# Patient Record
Sex: MALE | Race: WHITE
[De-identification: names, ages, dates, MRNs, and addresses within clinical notes are randomized per-mention and may not be internally consistent; named-entity substitution may affect disease eponyms.]

---

## 2019-07-29 ENCOUNTER — HOSPITAL ENCOUNTER (EMERGENCY)
Dept: HOSPITAL 33 - ED | Age: 59
Discharge: HOME | End: 2019-07-29
Payer: MEDICARE

## 2019-07-29 VITALS — DIASTOLIC BLOOD PRESSURE: 73 MMHG | SYSTOLIC BLOOD PRESSURE: 145 MMHG

## 2019-07-29 VITALS — OXYGEN SATURATION: 98 %

## 2019-07-29 VITALS — HEART RATE: 137 BPM

## 2019-07-29 DIAGNOSIS — R41.0: ICD-10-CM

## 2019-07-29 DIAGNOSIS — Z79.4: ICD-10-CM

## 2019-07-29 DIAGNOSIS — Z79.01: ICD-10-CM

## 2019-07-29 DIAGNOSIS — E16.2: Primary | ICD-10-CM

## 2019-07-29 LAB
ALBUMIN SERPL-MCNC: 4.2 G/DL (ref 3.5–5)
ALP SERPL-CCNC: 131 U/L (ref 38–126)
ALT SERPL-CCNC: 16 U/L (ref 0–50)
ANION GAP SERPL CALC-SCNC: 11.9 MEQ/L (ref 5–15)
ANISOCYTOSIS BLD QL: (no result)
AST SERPL QL: 21 U/L (ref 17–59)
BILIRUB BLD-MCNC: 0.5 MG/DL (ref 0.2–1.3)
BUN SERPL-MCNC: 23 MG/DL (ref 9–20)
CALCIUM SPEC-MCNC: 8.9 MG/DL (ref 8.4–10.2)
CELLS COUNTED: 100
CHLORIDE SERPL-SCNC: 103 MMOL/L (ref 98–107)
CO2 SERPL-SCNC: 27 MMOL/L (ref 22–30)
CREAT SERPL-MCNC: 1.17 MG/DL (ref 0.66–1.25)
ETHANOL SERPL-MCNC: < 10 MG/DL (ref 0–10)
GLUCOSE SERPL-MCNC: 197 MG/DL (ref 74–106)
HCT VFR BLD AUTO: 45.7 % (ref 42–50)
HGB BLD-MCNC: 14.9 GM/DL (ref 12.5–18)
MCH RBC QN AUTO: 28.6 PG (ref 26–32)
MCHC RBC AUTO-ENTMCNC: 32.6 G/DL (ref 32–36)
PLATELET # BLD AUTO: 194 K/MM3 (ref 150–450)
POIKILOCYTOSIS BLD QL SMEAR: (no result)
POTASSIUM SERPLBLD-SCNC: 4.2 MMOL/L (ref 3.5–5.1)
PROT SERPL-MCNC: 8.4 G/DL (ref 6.3–8.2)
RBC # BLD AUTO: 5.21 M/MM3 (ref 4.1–5.6)
SODIUM SERPL-SCNC: 137 MMOL/L (ref 137–145)
WBC # BLD AUTO: 13.8 K/MM3 (ref 4–10.5)

## 2019-07-29 PROCEDURE — 99284 EMERGENCY DEPT VISIT MOD MDM: CPT

## 2019-07-29 PROCEDURE — G0480 DRUG TEST DEF 1-7 CLASSES: HCPCS

## 2019-07-29 PROCEDURE — 70450 CT HEAD/BRAIN W/O DYE: CPT

## 2019-07-29 PROCEDURE — 36000 PLACE NEEDLE IN VEIN: CPT

## 2019-07-29 PROCEDURE — 93005 ELECTROCARDIOGRAM TRACING: CPT

## 2019-07-29 PROCEDURE — 96374 THER/PROPH/DIAG INJ IV PUSH: CPT

## 2019-07-29 PROCEDURE — 80053 COMPREHEN METABOLIC PANEL: CPT

## 2019-07-29 PROCEDURE — 36415 COLL VENOUS BLD VENIPUNCTURE: CPT

## 2019-07-29 PROCEDURE — 85025 COMPLETE CBC W/AUTO DIFF WBC: CPT

## 2019-07-29 PROCEDURE — 80307 DRUG TEST PRSMV CHEM ANLYZR: CPT

## 2019-07-29 PROCEDURE — 82962 GLUCOSE BLOOD TEST: CPT

## 2019-07-29 PROCEDURE — 93041 RHYTHM ECG TRACING: CPT

## 2019-07-29 PROCEDURE — 94760 N-INVAS EAR/PLS OXIMETRY 1: CPT

## 2019-07-29 NOTE — ERPHSYRPT
- History of Present Illness


Time Seen by Provider: 07/29/19 18:30


Source: patient, EMS


Exam Limitations: clinical condition


Physician History: 





58 y/o obese diabetic male with h/o copd brought into ED with low blood glucose 

and confusion. pt improved after amp of d50. pt arrives alert and answering 

questions appropriately. denies head ache, denies soa, denies cp, denies abd 

pain.


Timing/Duration: today


Severity: moderate


Associated Symptoms: malaise, No nausea, No vomiting, No abdominal pain, No 

shortness of breath, No chest pain, No headaches


Allergies/Adverse Reactions: 








codeine Allergy (Verified 07/29/19 18:54)


 


morphine Allergy (Verified 07/29/19 18:54)


 


tramadol Allergy (Verified 07/29/19 18:54)


 





Home Medications: 








Albuterol Sulfate [Proair Hfa] 8.5 gm IH Q6H PRN PRN 07/29/19 [History]


Aspirin EC 81 mg*** [Ecotrin 81 mg***] 81 mg PO DAILY 07/29/19 [History]


Atorvastatin Calcium 80 mg PO DAILY 07/29/19 [History]


Brexpiprazole [Rexulti] 1 mg PO DAILY 07/29/19 [History]


Bupropion HCl [Wellbutrin Xl] 300 mg PO DAILY 07/29/19 [History]


Doxepin HCl 75 mg PO DAILY 07/29/19 [History]


Duloxetine HCl [Cymbalta] 60 mg PO DAILY 07/29/19 [History]


Exenatide Microspheres [Bydureon Bcise] 2 mg SQ WEEKLY 07/29/19 [History]


Gabapentin 600 mg PO TID 07/29/19 [History]


Insulin Glargine,Hum.rec.anlog [Basaglar Kwikpen U-100] 60 unit SQ DAILY 07/29/ 19 [History]


Insulin Glulisine [Apidra Solostar] 10 unit SQ EVENING MEAL 07/29/19 [History]


Liraglutide [Victoza 2-Maxx] 0.6 mg SQ QDP PRN 07/29/19 [History]


Lisinopril 10 mg*** [Zestril 10 MG***] 10 mg PO DAILY 07/29/19 [History]


Nitroglycerin 0.2 mg/Hr*** [Nitro-Dur 0.2 mg/Hr***] 0.2 mg TOP DAILY 07/29/19 [

History]


Omega-3 Fatty Acids/Fish Oil [Fish Oil 1,000 mg Capsule] 1 each PO BID 07/29/19 

[History]


Rivaroxaban [Xarelto] 20 mg PO DAILY 07/29/19 [History]


Sotalol HCl [Sotalol] 120 mg PO BID 07/29/19 [History]


Tamsulosin HCl 0.4 mg PO DAILY 07/29/19 [History]


Tapentadol HCl [Nucynta] 75 mg PO QID 07/29/19 [History]








- Review of Systems


Constitutional: No Symptoms


Eyes: No Symptoms


Ears, Nose, & Throat: No Symptoms


Respiratory: No Symptoms


Cardiac: No Symptoms


Abdominal/Gastrointestinal: No Symptoms


Genitourinary Symptoms: No Symptoms


Musculoskeletal: No Symptoms


Skin: No Symptoms


Neurological: Lethargy


Psychological: No Symptoms


Endocrine: No Symptoms


Hematologic/Lymphatic: No Symptoms


Immunological/Allergic: No Symptoms


All Other Systems: Reviewed and Negative





- Past Medical History


Pertinent Past Medical History: Yes


Neurological History: No Pertinent History


ENT History: No Pertinent History


Cardiac History: No Pertinent History


Respiratory History: No Pertinent History


Endocrine Medical History: No Pertinent History


Musculoskeletal History: No Pertinent History


GI Medical History: No Pertinent History


 History: No Pertinent History


Psycho-Social History: No Pertinent History


Male Reproductive Disorders: No Pertinent History





- Past Surgical History


Neuro Surgical History: No Pertinent History


Cardiac: No Pertinent History


Respiratory: No Pertinent History


Gastrointestinal: No Pertinent History


Genitourinary: No Pertinent History


Musculoskeletal: No Pertinent History


Male Surgical History: No Pertinent History





- Nursing Vital Signs


Nursing Vital Signs: 


 Initial Vital Signs











Pulse Rate  73   07/29/19 18:31


 


Respiratory Rate  20   07/29/19 18:31


 


Blood Pressure  141/70   07/29/19 18:31


 


O2 Sat by Pulse Oximetry  99   07/29/19 18:31








 Pain Scale











Pain Intensity                 0

















- Physical Exam


General Appearance: alert, obese


Eye Exam: PERRL/EOMI, eyes nml inspection


Ears, Nose, Throat Exam: normal ENT inspection, moist mucous membranes


Neck Exam: normal inspection, non-tender, supple, full range of motion


Respiratory Exam: normal breath sounds, lungs clear, airway intact, No chest 

tenderness, No respiratory distress


Cardiovascular Exam: regular rate/rhythm, normal heart sounds, normal 

peripheral pulses


Gastrointestinal/Abdomen Exam: soft, normal bowel sounds, No tenderness


Rectal Exam: not done


Back Exam: normal inspection, normal range of motion, No CVA tenderness, No 

vertebral tenderness


Extremity Exam: normal inspection, normal range of motion, pelvis stable


Neurologic Exam: alert, oriented x 3, cooperative, CNs II-XII nml as tested


Skin Exam: normal color, warm, dry


Lymphatic Exam: No adenopathy


**SpO2 Interpretation**: normal


O2 Delivery: Room Air





- Course


Nursing assessment & vital signs reviewed: Yes


EKG Interpreted by Me: RATE ([id number]), Sinus Rhythm, NORMAL AXIS, NORMAL INTERVALS, 

NORMAL QRS, Other (SI/QIII pattern. no comparison ekg)


Ordered Tests: 


 Active Orders 24 hr











 Category Date Time Status


 


 ACCUCHECK [Accucheck] STAT Care  07/29/19 18:40 Active


 


 Cardiac Monitor STAT Care  07/29/19 18:41 Active


 


 Clean Catch Urine Specimen STAT Care  07/29/19 18:41 Active


 


 EKG-ER Only STAT Care  07/29/19 18:40 Active


 


 IV Insertion STAT Care  07/29/19 18:40 Active


 


 Pulse Oximetry (ED) STAT Care  07/29/19 18:44 Active


 


 HEAD WITHOUT CONTRAST [CT] Stat Exams  07/29/19 19:53 Taken


 


 Alcohol [ETHYL ALCOHOL] Stat Lab  07/29/19 18:55 Completed


 


 CBC W DIFF Stat Lab  07/29/19 18:55 Completed


 


 CMP Stat Lab  07/29/19 18:55 Completed


 


 Manual Differential NC Stat Lab  07/29/19 18:55 Completed


 


 UA W/RFX UR CULTURE Stat Lab  07/29/19 18:41 Uncollected


 


 Urine Triage Profile Stat Lab  07/29/19 Uncollected








Medication Summary











Generic Name Dose Route Start Last Admin





  Trade Name Freq  PRN Reason Stop Dose Admin


 


Dextrose/Sodium Chloride  1,000 mls @ 100 mls/hr  07/29/19 19:30  07/29/19 19:11





  Dextrose 5%-Ns Iv Solution 1000 Ml  IV  08/28/19 19:29  100 mls/hr





  .Q10H SHANON   Administration





     





     





     





     














Discontinued Medications














Generic Name Dose Route Start Last Admin





  Trade Name Freq  PRN Reason Stop Dose Admin


 


Dextrose  Confirm  07/29/19 18:32  





  D50w 50 Ml Abboject***  Administered  07/29/19 18:33  





  Dose   





  50 ml   





  IV   





  .STK-MED ONE   





     





     





     





     


 


Dextrose  50 ml  07/29/19 18:42  07/29/19 18:40





  D50w 50ml Vial***  IV  07/29/19 18:43  50 ml





  STAT ONE   Administration





     





     





     





     











Lab/Rad Data: 


 Laboratory Result Diagrams





 07/29/19 18:55 





 07/29/19 18:55 





 Laboratory Results











  07/29/19 07/29/19 Range/Units





  18:55 18:55 


 


WBC   13.8 H  (4.0-10.5)  K/mm3


 


RBC   5.21  (4.1-5.6)  M/mm3


 


Hgb   14.9  (12.5-18.0)  gm/dl


 


Hct   45.7  (42-50)  %


 


MCV   87.7  ()  fl


 


MCH   28.6  (26-32)  pg


 


MCHC   32.6  (32-36)  g/dl


 


RDW   14.1 H  (11.5-14.0)  %


 


Plt Count   194  (150-450)  K/mm3


 


MPV   9.7 H  (6-9.5)  fl


 


Segmented Neutrophils   74 H  (36.-66.)  %


 


Lymphocytes (Manual)   18 L  (24-44)  %


 


Eosinophils (Manual)   5 H  (0.00-3.0)  %


 


Basophils (Manual)   3 H  (0.0-1.0)  %


 


Hypochromia   1+  


 


Platelet Estimate   NORMAL  (NORMAL)  


 


Poikilocytosis   1+  


 


Anisocytosis   1+  


 


Sodium  137   (137-145)  mmol/L


 


Potassium  4.2   (3.5-5.1)  mmol/L


 


Chloride  103   ()  mmol/L


 


Carbon Dioxide  27   (22-30)  mmol/L


 


Anion Gap  11.9   (5-15)  MEQ/L


 


BUN  23 H   (9-20)  mg/dL


 


Creatinine  1.17   (0.66-1.25)  mg/dL


 


Estimated GFR  > 60.0   ML/MIN


 


Glucose  197 H   ()  mg/dL


 


Calcium  8.9   (8.4-10.2)  mg/dL


 


Total Bilirubin  0.50   (0.2-1.3)  mg/dL


 


AST  21   (17-59)  U/L


 


ALT  16   (0-50)  U/L


 


Alkaline Phosphatase  131 H   ()  U/L


 


Serum Total Protein  8.4 H   (6.3-8.2)  g/dL


 


Albumin  4.2   (3.5-5.0)  g/dL


 


Ethyl Alcohol  < 10   (0-10)  mg/dL














- Progress


Progress: improved, re-examined


Progress Note: 





07/29/19 21:32


pt denies cp, denies soa and denies abd pain. pt is alert and oriented. 





ct head-no acute intracranial abnormality


Counseled pt/family regarding: lab results, diagnosis, need for follow-up, rad 

results





- Departure


Departure Disposition: Home


Clinical Impression: 


 Hypoglycemia, Confusion





Condition: Stable


Critical Care Time: No


Referrals: 


ANNE HINKLE MD [Primary Care Provider] - 


Additional Instructions: 


hold you night time insulin or oral diabetic pills. follow up with your primary 

doctor tomorrow for further management

## 2019-07-30 NOTE — XRAY
Exam: CT of the head without IV contrast from 7/29/2019.

               CTDI:  70.55



Comparison: None.



Indication: 59-year-old male with confusion x one day and low blood sugar, no

history of prior head surgery.



Technique: Non-IV contrast axial images were obtained through the brain.

Reconstructed coronal and sagittal images were created and reviewed.



Findings: The ventricles are of unremarkable size and shape.  No focal mass

effect or midline shift is seen.  No acute intracranial bleed or abnormal

extra-axial fluid collection is seen.  There are some subtle deep white matter

changes, likely due to minimal chronic microvascular disease.  No low

attenuation territorial infarct is seen.  The cortical sulci and basilar

cisterns appear unremarkable.  Structures of posterior fossa appear

unremarkable.



The calvarium of the skull appears intact.  There is some minimal mucosal

thickening within the frontoethmoidal recesses and ethmoid sinuses

bilaterally.  No air-fluid levels are seen.  The mastoid air cells are well

aerated without effusion.  The middle ear cavities appear grossly unremarkable.



There appears to be a 1.1 cm in diameter sebaceous cyst near the vertex the

skull just to the right of midline on axial images #36 and #37.  I believe

there is also a small sebaceous cyst measuring about 8.3 mm in diameter within

the posterior right occipital region on axial image #22.



There is a question of some minimal scalp soft tissue swelling overlying the

high left parietal region.  Correlate clinically.  Incidentally, there are

some calcifications within the soft tissues of each external ear, more

numerous on the right than left.



Impression:



1.  No acute intracranial bleed or other acute intracranial abnormality is

seen.



2.  I believe there is some mild chronic mucosal thickening within both

frontal ethmoid recesses and both ethmoid sinuses, likely due to chronic sinus

disease/sinusitis.  No air-fluid levels are seen.



3.  Other incidental findings, as discussed above.

## 2022-08-25 ENCOUNTER — OFFICE (OUTPATIENT)
Dept: URBAN - METROPOLITAN AREA OTHER 5 | Facility: OTHER | Age: 62
End: 2022-08-25

## 2022-08-25 VITALS
OXYGEN SATURATION: 95 % | TEMPERATURE: 98.7 F | WEIGHT: 276 LBS | RESPIRATION RATE: 16 BRPM | HEIGHT: 72 IN | HEART RATE: 90 BPM

## 2022-08-25 DIAGNOSIS — F17.200 NICOTINE DEPENDENCE, UNSPECIFIED, UNCOMPLICATED: ICD-10-CM

## 2022-08-25 DIAGNOSIS — K76.0 FATTY (CHANGE OF) LIVER, NOT ELSEWHERE CLASSIFIED: ICD-10-CM

## 2022-08-25 DIAGNOSIS — E66.9 OBESITY, UNSPECIFIED: ICD-10-CM

## 2022-08-25 PROCEDURE — 99213 OFFICE O/P EST LOW 20 MIN: CPT
